# Patient Record
Sex: MALE | Race: WHITE | NOT HISPANIC OR LATINO | Employment: OTHER | URBAN - METROPOLITAN AREA
[De-identification: names, ages, dates, MRNs, and addresses within clinical notes are randomized per-mention and may not be internally consistent; named-entity substitution may affect disease eponyms.]

---

## 2018-09-10 ENCOUNTER — OFFICE VISIT CONVERTED (OUTPATIENT)
Dept: UROLOGY | Facility: CLINIC | Age: 73
End: 2018-09-10
Attending: UROLOGY

## 2019-04-24 ENCOUNTER — OFFICE VISIT CONVERTED (OUTPATIENT)
Dept: SURGERY | Facility: CLINIC | Age: 74
End: 2019-04-24
Attending: PHYSICIAN ASSISTANT

## 2019-04-24 ENCOUNTER — HOSPITAL ENCOUNTER (OUTPATIENT)
Dept: SURGERY | Facility: CLINIC | Age: 74
Discharge: HOME | End: 2019-04-24
Attending: PHYSICIAN ASSISTANT

## 2019-04-24 ENCOUNTER — CONVERSION ENCOUNTER (OUTPATIENT)
Dept: SURGERY | Facility: CLINIC | Age: 74
End: 2019-04-24

## 2019-04-26 LAB — BACTERIA UR CULT: NORMAL

## 2019-05-22 ENCOUNTER — OFFICE VISIT CONVERTED (OUTPATIENT)
Dept: SURGERY | Facility: CLINIC | Age: 74
End: 2019-05-22
Attending: PHYSICIAN ASSISTANT

## 2019-09-10 ENCOUNTER — HOSPITAL ENCOUNTER (OUTPATIENT)
Dept: OTHER | Facility: HOSPITAL | Age: 74
Discharge: HOME | End: 2019-09-10
Attending: UROLOGY

## 2019-09-10 LAB — PSA SERPL-MCNC: 2.07 NG/ML (ref 0–4)

## 2020-07-07 ENCOUNTER — HOSPITAL ENCOUNTER (OUTPATIENT)
Dept: OTHER | Facility: HOSPITAL | Age: 75
Discharge: HOME | End: 2020-07-07
Attending: PHYSICIAN ASSISTANT

## 2020-07-07 LAB — PSA SERPL-MCNC: 1.34 NG/ML (ref 0–4)

## 2020-07-13 ENCOUNTER — TELEPHONE CONVERTED (OUTPATIENT)
Dept: UROLOGY | Facility: CLINIC | Age: 75
End: 2020-07-13
Attending: UROLOGY

## 2021-05-13 NOTE — PROGRESS NOTES
Progress Note      Patient Name: Lewis Runner   Patient ID: [id number]   Sex: Male   YOB: 1945    Primary Care Provider: Rajan Orozco MD   Referring Provider: Rajan Orozco MD    Visit Date: July 13, 2020    Provider: Ramón Keys MD   [facility]   Location Address: [address] [place] [address]  [id number]   Location Phone: [phone]          Chief Complaint  · pt here for urologic issues     1 month follow-up       History Of Present Illness  TELEHEALTH TELEPHONE VISIT  Lewis C. Runner is a 74 year old /White male who is presenting for evaluation via telehealth telephone visit. Verbal consent obtained before beginning visit.   Provider spent 13 minutes with the patient during the telehealth visit.   The following staff were present during this visit: imer chamorro   Past Medical History/ Overview of Patient Symptoms     74-year-old  gentleman who is been followed chronically elevated PSAs and some BPH.      On Flomax and finasteride daily.  Ok stream.  Flomax does help.  No trouble with initiation of stream. Nocturia X 1-2 .  Improvement in daytime frequency.  No incontinence.      no gross hematuria or urinary tract infections.       PVR    5/19       Previous medical notes:      No history of kidney stone.    No urologic family history,   Has never had any urologic surgery.    No cardiopulmonary history.  Patient does not smoke.  Patient does not use blood thinner.  Father lived to be 90, grandfather lived to 83.    h/o left retromolar squamous cell carcinoma.  Status post surgery 2/17 and XRT to face 4/17    PSA -on  finasteride    7/20   1.34  9/19   2.07  9/18   1.77  6/17  1.64  2/16   3.3  11/15  5.5  11/14  3.75  9/13   2.7  7/11   2.9  11/10 2.8  2/02  1.9           Past Medical History  Back pain; BPH; BPH with Urinary Obstruction; Elevated prostate specific antigen (PSA); Elevated PSA; GERD; Mouth Cancer; Radiation Therapy;  "Screening for prostate cancer         Past Surgical History  Blepharoplasty of bilateral upper and lower eyelids; Colonoscopy; Dissection, neck, radical, with mandibulectomy; Graft of mandible; Tonsilectomy         Medication List  duloxetine 30 mg oral capsule,delayed release(DR/EC); finasteride 5 mg oral tablet; levothyroxine 25 mcg oral tablet; multivitamin oral capsule; omeprazole 40 mg oral capsule,delayed release(DR/EC); oxycodone 5 mg oral tablet; tamsulosin 0.4 mg oral capsule         Allergy List  No known allergies         Family Medical History  Liver Neoplasm, Malignant; Heart Disease         Social History  Tobacco (Former)         Immunizations  Name Date Admin   Influenza          Review of Systems  · Constitutional  o Denies  o : fever  · Gastrointestinal  o Denies  o : vomiting, flank pain  · Genitourinary  o Denies  o : urinary leakage, trouble voiding      Vitals  Date Time BP Position Site L\R Cuff Size HR RR TEMP (F) WT  HT  BMI kg/m2 BSA m2 O2 Sat        05/22/2019 10:45 AM       16  169lbs 9oz 5'  3\" 30.04 1.85                   Assessment  · Prostate CA     185/C61  · Benign prostatic hyperplasia with urinary obstruction       Benign prostatic hyperplasia with lower urinary tract symptoms     600.01/N40.1  Other obstructive and reflux uropathy     600.01/N13.8  · Incomplete bladder emptying     788.21/R33.9  · Prostate cancer screening     V76.44/Z12.5    Problems Reconciled  Plan  · Orders  o Physician Telephone Evaluation, 11-20 minutes (62887) - 600.01/N13.8, 185/C61 - 07/13/2020  o PSA Screening, Ultrasensitive, MEDICARE [facility] () - V76.44/Z12.5 - 07/13/2021  · Medications  o Medications have been Reconciled  o Transition of Care or Provider Policy  · Instructions  o Electronically Identified Patient Education Materials Provided Electronically         BPH    Continue Flomax 0.4 mg daily and finasteride 5 mg daily.  Refill today    Prostate cancer screening    Patient has longevity in " his family after discussion we will continue to follow his PSAs for a few more years follow-up in 1 year with a PSA    PVR at  follow-up                   Electronically Signed by: Ramón Keys MD -Author on July 13, 2020 11:31:00 AM

## 2021-05-15 VITALS — RESPIRATION RATE: 12 BRPM | HEIGHT: 65 IN | BODY MASS INDEX: 28.89 KG/M2 | WEIGHT: 173.37 LBS

## 2021-05-15 VITALS — WEIGHT: 169.56 LBS | HEIGHT: 63 IN | RESPIRATION RATE: 16 BRPM | BODY MASS INDEX: 30.04 KG/M2

## 2021-05-16 VITALS — HEIGHT: 66 IN | BODY MASS INDEX: 28.77 KG/M2 | WEIGHT: 179 LBS | RESPIRATION RATE: 15 BRPM

## 2021-07-24 PROBLEM — C06.9 MOUTH CANCER (HCC): Status: ACTIVE | Noted: 2021-07-24

## 2021-07-24 PROBLEM — R97.20 ELEVATED PROSTATE SPECIFIC ANTIGEN (PSA): Status: ACTIVE | Noted: 2017-06-21

## 2021-07-24 PROBLEM — K21.9 GERD (GASTROESOPHAGEAL REFLUX DISEASE): Status: ACTIVE | Noted: 2021-07-24

## 2021-07-24 PROBLEM — M54.9 BACK PAIN: Status: ACTIVE | Noted: 2021-07-24

## 2021-07-24 PROBLEM — Z92.3 HX OF RADIATION THERAPY: Status: ACTIVE | Noted: 2021-07-24

## 2021-07-24 PROBLEM — N40.0 BPH (BENIGN PROSTATIC HYPERPLASIA): Status: ACTIVE | Noted: 2021-07-24

## 2021-09-28 ENCOUNTER — TRANSCRIBE ORDERS (OUTPATIENT)
Dept: ADMINISTRATIVE | Facility: HOSPITAL | Age: 76
End: 2021-09-28

## 2021-09-28 ENCOUNTER — LAB (OUTPATIENT)
Dept: LAB | Facility: HOSPITAL | Age: 76
End: 2021-09-28

## 2021-09-28 DIAGNOSIS — Z12.5 SCREENING FOR PROSTATE CANCER: Primary | ICD-10-CM

## 2021-09-28 DIAGNOSIS — Z12.5 SCREENING FOR PROSTATE CANCER: ICD-10-CM

## 2021-09-28 LAB — PSA SERPL-MCNC: 1.14 NG/ML (ref 0–4)

## 2021-09-28 PROCEDURE — G0103 PSA SCREENING: HCPCS

## 2021-09-28 PROCEDURE — 36415 COLL VENOUS BLD VENIPUNCTURE: CPT

## 2021-10-03 PROBLEM — C61 PROSTATE CANCER: Status: ACTIVE | Noted: 2021-10-03

## 2021-10-03 NOTE — PROGRESS NOTES
Chief Complaint    Urologic complaint    Subjective          Lewis C Runner presents to [facility]  History of Present Illness     75-year-old  gentleman who is been followed chronically elevated PSAs and some BPH.      Patient did increase his dose dose of Flomax 0.8 mg daily.  He can tell that he does better with 0.8 and 0.4.  Is been doing this for about a week.  He is also on finasteride 5 mg daily for the last 2 years.  Minimal incontinence, no pads.     no GH/UTI     He is still dealing with squamous cell carcinoma.  Surgery was 8/21    PVR    10/21   000    Results for orders placed or performed in visit on 10/04/21   Bladder Scan   Result Value Ref Range    Urine Volume 0    POC Urinalysis Dipstick    Specimen: Urine   Result Value Ref Range    Color Yellow Yellow, Straw, Dark Yellow, Lizabeth    Clarity, UA Clear Clear    Glucose, UA Negative Negative, 1000 mg/dL (3+) mg/dL    Bilirubin Negative Negative    Ketones, UA Negative Negative    Specific Gravity  1.020 1.005 - 1.030    Blood, UA Negative Negative    pH, Urine 7.0 5.0 - 8.0    Protein, POC Negative Negative mg/dL    Urobilinogen, UA Normal Normal    Leukocytes Negative Negative    Nitrite, UA Negative Negative         Previous medical notes:      No history of kidney stone.    No urologic family history,   Has never had any urologic surgery.    No cardiopulmonary history.  Patient does not smoke.  Patient does not use blood thinner.  Father lived to be 90, grandfather lived to 83.    h/o left retromolar squamous cell carcinoma.  Status post surgery 2/17 and XRT to face 4/17    PSA -on  finasteride    10/21  1.14  7/20    1.34  9/19   2.07  9/18   1.77  6/17  1.64  2/16   3.3  11/15  5.5  11/14  3.75  9/13   2.7  7/11   2.9  11/10 2.8  2/02  1.9         Past History:  Medical History: has a past medical history of Back pain, BPH (benign prostatic hyperplasia), BPH with urinary obstruction (11/18/2015), Elevated PSA  (06/21/2017), Elevated PSA (11/19/2015), GERD (gastroesophageal reflux disease), History of radiation therapy, Mouth cancer (CMS/HCC), and Screening for prostate cancer (11/12/2014).   Surgical History: has a past surgical history that includes Blepharoplasty (Bilateral); Colonoscopy; clotilde mandibulectomy with fibula shaft graft and neck dissection; and Tonsilectomy, adenoidectomy, bilateral myringotomy and tubes.   Family History: family history includes Heart disease in his mother; Liver cancer in his mother.   Social History: reports that he has quit smoking. He does not have any smokeless tobacco history on file.  Allergies: Patient has no allergy information on record.     No current outpatient medications on file.     Physical exam       Alert and orient x3  Well appearing, well developed, in no acute distress   Unlabored respirations  Nontender/nondistended      Grossly oriented to person, place and time, judgment is intact, normal mood and affect    Results for orders placed or performed in visit on 09/28/21   PSA Screen    Specimen: Blood   Result Value Ref Range    PSA 1.140 0.000 - 4.000 ng/mL        Objective     Vital Signs:   There were no vitals taken for this visit.             Assessment and Plan    Diagnoses and all orders for this visit:    1. Benign prostatic hyperplasia with urinary frequency (Primary)    2. Prostate cancer (HCC)      BPH    Continue Flomax 0.8 mg daily and finasteride 5 mg daily.  Refilled today.  May be interested in procedures in the future but at this time wants to hold off.    Prostate cancer screening    No further screening.  Risk and benefits discussed.    F/u with NP in 1 yr in [place]

## 2021-10-04 ENCOUNTER — OFFICE VISIT (OUTPATIENT)
Dept: UROLOGY | Facility: CLINIC | Age: 76
End: 2021-10-04

## 2021-10-04 VITALS — RESPIRATION RATE: 17 BRPM | BODY MASS INDEX: 32.89 KG/M2 | HEIGHT: 63 IN | WEIGHT: 185.6 LBS

## 2021-10-04 DIAGNOSIS — R35.0 BENIGN PROSTATIC HYPERPLASIA WITH URINARY FREQUENCY: Primary | ICD-10-CM

## 2021-10-04 DIAGNOSIS — N40.1 BENIGN PROSTATIC HYPERPLASIA WITH URINARY FREQUENCY: Primary | ICD-10-CM

## 2021-10-04 DIAGNOSIS — C61 PROSTATE CANCER (HCC): ICD-10-CM

## 2021-10-04 LAB
BILIRUB BLD-MCNC: NEGATIVE MG/DL
CLARITY, POC: CLEAR
COLOR UR: YELLOW
GLUCOSE UR STRIP-MCNC: NEGATIVE MG/DL
KETONES UR QL: NEGATIVE
LEUKOCYTE EST, POC: NEGATIVE
NITRITE UR-MCNC: NEGATIVE MG/ML
PH UR: 7 [PH] (ref 5–8)
PROT UR STRIP-MCNC: NEGATIVE MG/DL
RBC # UR STRIP: NEGATIVE /UL
SP GR UR: 1.02 (ref 1–1.03)
URINE VOLUME: 0
UROBILINOGEN UR QL: NORMAL

## 2021-10-04 PROCEDURE — 51798 US URINE CAPACITY MEASURE: CPT | Performed by: UROLOGY

## 2021-10-04 PROCEDURE — 99212 OFFICE O/P EST SF 10 MIN: CPT | Performed by: UROLOGY

## 2021-10-04 PROCEDURE — 81003 URINALYSIS AUTO W/O SCOPE: CPT | Performed by: UROLOGY

## 2021-10-04 RX ORDER — LEVOTHYROXINE SODIUM 0.03 MG/1
TABLET ORAL
COMMUNITY
Start: 2021-08-19 | End: 2023-01-30 | Stop reason: SDUPTHER

## 2021-10-04 RX ORDER — TAMSULOSIN HYDROCHLORIDE 0.4 MG/1
2 CAPSULE ORAL DAILY
Qty: 180 CAPSULE | Refills: 4 | Status: SHIPPED | OUTPATIENT
Start: 2021-10-04 | End: 2022-12-05 | Stop reason: SDUPTHER

## 2021-10-04 RX ORDER — FINASTERIDE 5 MG/1
5 TABLET, FILM COATED ORAL DAILY
COMMUNITY
Start: 2021-04-21 | End: 2021-10-04 | Stop reason: SDUPTHER

## 2021-10-04 RX ORDER — MULTIPLE VITAMINS W/ MINERALS TAB 9MG-400MCG
1 TAB ORAL DAILY
COMMUNITY
End: 2023-01-30 | Stop reason: SDUPTHER

## 2021-10-04 RX ORDER — PENTOXIFYLLINE 400 MG/1
400 TABLET, EXTENDED RELEASE ORAL DAILY
COMMUNITY
Start: 2021-08-13 | End: 2021-11-11

## 2021-10-04 RX ORDER — FINASTERIDE 5 MG/1
5 TABLET, FILM COATED ORAL DAILY
Qty: 90 TABLET | Refills: 4 | Status: SHIPPED | OUTPATIENT
Start: 2021-10-04 | End: 2022-10-03 | Stop reason: SDUPTHER

## 2021-10-04 RX ORDER — OMEPRAZOLE 20 MG/1
20 CAPSULE, DELAYED RELEASE ORAL DAILY
COMMUNITY
End: 2023-02-13

## 2021-10-04 RX ORDER — DULOXETIN HYDROCHLORIDE 60 MG/1
60 CAPSULE, DELAYED RELEASE ORAL DAILY
COMMUNITY
End: 2023-01-23

## 2021-10-04 RX ORDER — ASPIRIN 81 MG/1
81 TABLET, CHEWABLE ORAL DAILY
COMMUNITY
End: 2022-12-05

## 2021-10-04 RX ORDER — GABAPENTIN 300 MG/1
300 CAPSULE ORAL
COMMUNITY
Start: 2021-06-28 | End: 2022-12-05

## 2022-08-11 ENCOUNTER — TELEPHONE (OUTPATIENT)
Dept: UROLOGY | Facility: CLINIC | Age: 77
End: 2022-08-11

## 2022-08-16 ENCOUNTER — TELEPHONE (OUTPATIENT)
Dept: UROLOGY | Facility: CLINIC | Age: 77
End: 2022-08-16

## 2022-08-16 DIAGNOSIS — C61 PROSTATE CANCER: Primary | ICD-10-CM

## 2022-10-03 ENCOUNTER — TELEPHONE (OUTPATIENT)
Dept: SURGERY | Facility: CLINIC | Age: 77
End: 2022-10-03

## 2022-10-03 DIAGNOSIS — R35.0 BENIGN PROSTATIC HYPERPLASIA WITH URINARY FREQUENCY: ICD-10-CM

## 2022-10-03 DIAGNOSIS — N40.1 BENIGN PROSTATIC HYPERPLASIA WITH URINARY FREQUENCY: ICD-10-CM

## 2022-10-03 RX ORDER — FINASTERIDE 5 MG/1
5 TABLET, FILM COATED ORAL DAILY
Qty: 90 TABLET | Refills: 0 | Status: SHIPPED | OUTPATIENT
Start: 2022-10-03 | End: 2022-12-05 | Stop reason: SDUPTHER

## 2022-10-03 NOTE — TELEPHONE ENCOUNTER
Pts wife called and stated that pts prescription for finasteride will run out before he sees  in [facility] in December. She stated that the appt was going to be in oct with Carolina. She is afraid with appt being pushed out meds wont last. She would like for a 90 day supply be sent in through [facility] mail service pharmacy.    Slowed

## 2022-11-22 ENCOUNTER — LAB (OUTPATIENT)
Dept: LAB | Facility: HOSPITAL | Age: 77
End: 2022-11-22

## 2022-11-22 DIAGNOSIS — C61 PROSTATE CANCER: ICD-10-CM

## 2022-11-22 LAB — PSA SERPL-MCNC: 2.3 NG/ML (ref 0–4)

## 2022-11-22 PROCEDURE — 84153 ASSAY OF PSA TOTAL: CPT

## 2022-11-22 PROCEDURE — 36415 COLL VENOUS BLD VENIPUNCTURE: CPT

## 2022-12-03 PROBLEM — N40.1 BENIGN PROSTATIC HYPERPLASIA WITH LOWER URINARY TRACT SYMPTOMS: Status: ACTIVE | Noted: 2022-12-03

## 2022-12-03 NOTE — PROGRESS NOTES
Chief Complaint    Urologic complaint    Subjective          Juan Jose SANFORD Runjamison presents to [facility]  History of Present Illness     77-year-old  gentleman      h/o elevated PSA   BPH.      Recent pneumonia, doing okay    Currently on Flomax 0.4 mg daily and finasteride 5 mg daily.  Minimal leakage.  No pads.  Nocturia x2.  Voiding okay, no straining.      Flomax 0.8 - did not make things any better so stayed on 1 tab    no GH    H/o squamous cell carcinoma neck 8/21-doing well, cured as far as he knows    No cardiopulmonary history.  Patient does not smoke.  Patient does not use blood thinner.  Father lived to be 90, grandfather lived to 83.    No further prostate cancer screening    PVR    10/21   000    Results for orders placed or performed in visit on 11/22/22   PSA DIAGNOSTIC    Specimen: Blood   Result Value Ref Range    PSA 2.300 0.000 - 4.000 ng/mL         Previous medical notes:      No history of kidney stone.    No urologic family history,   Has never had any urologic surgery.        h/o left retromolar squamous cell carcinoma.  Status post surgery 2/17 and XRT to face 4/17    PSA -on  finasteride    10/21  1.14  7/20    1.34  9/19   2.07  9/18   1.77  6/17  1.64  2/16   3.3  11/15  5.5  11/14  3.75  9/13   2.7  7/11   2.9  11/10 2.8  2/02  1.9         Past History:  Medical History: has a past medical history of Back pain, BPH (benign prostatic hyperplasia), BPH with urinary obstruction (11/18/2015), Elevated PSA (06/21/2017), Elevated PSA (11/19/2015), GERD (gastroesophageal reflux disease), History of radiation therapy, Mouth cancer (HCC), and Screening for prostate cancer (11/12/2014).   Surgical History: has a past surgical history that includes Blepharoplasty (Bilateral); Colonoscopy; clotilde mandibulectomy with fibula shaft graft and neck dissection; and Tonsilectomy, adenoidectomy, bilateral myringotomy and tubes.   Family History: family history includes Heart  disease in his mother; Liver cancer in his mother.   Social History: reports that he has quit smoking. He has never used smokeless tobacco.  Allergies: Patient has no known allergies.       Current Outpatient Medications:   •  aspirin 81 MG chewable tablet, Chew 81 mg Daily., Disp: , Rfl:   •  DULoxetine (CYMBALTA) 60 MG capsule, Take 60 mg by mouth Daily., Disp: , Rfl:   •  finasteride (PROSCAR) 5 MG tablet, Take 1 tablet by mouth Daily., Disp: 90 tablet, Rfl: 0  •  gabapentin (NEURONTIN) 300 MG capsule, Take 300 mg by mouth., Disp: , Rfl:   •  levothyroxine (SYNTHROID, LEVOTHROID) 25 MCG tablet, , Disp: , Rfl:   •  multivitamin with minerals (multivitamin with minerals) tablet tablet, Take 1 tablet by mouth Daily., Disp: , Rfl:   •  omeprazole (priLOSEC) 20 MG capsule, Take 20 mg by mouth Daily., Disp: , Rfl:   •  tamsulosin (FLOMAX) 0.4 MG capsule 24 hr capsule, Take 2 capsules by mouth Daily., Disp: 180 capsule, Rfl: 4         Results for orders placed or performed in visit on 11/22/22   PSA DIAGNOSTIC    Specimen: Blood   Result Value Ref Range    PSA 2.300 0.000 - 4.000 ng/mL        Objective     Vital Signs:   There were no vitals taken for this visit.             Assessment and Plan    Diagnoses and all orders for this visit:    1. Benign prostatic hyperplasia with lower urinary tract symptoms, symptom details unspecified (Primary)      BPH    Continue Flomax 0.4 mg daily and finasteride 5 mg daily.  Refilled today.    Discussed with patient risk and benefits of TURP today, patient not interested if he changes mind he will let me know.        Prostate cancer screening    No further screening.      F/u with NP in 1 yr in [place]

## 2022-12-05 ENCOUNTER — OFFICE VISIT (OUTPATIENT)
Dept: UROLOGY | Facility: CLINIC | Age: 77
End: 2022-12-05

## 2022-12-05 ENCOUNTER — TELEPHONE (OUTPATIENT)
Dept: UROLOGY | Facility: CLINIC | Age: 77
End: 2022-12-05

## 2022-12-05 VITALS — WEIGHT: 188.6 LBS | BODY MASS INDEX: 33.41 KG/M2 | RESPIRATION RATE: 18 BRPM

## 2022-12-05 DIAGNOSIS — N40.1 BENIGN PROSTATIC HYPERPLASIA WITH URINARY FREQUENCY: ICD-10-CM

## 2022-12-05 DIAGNOSIS — N40.1 BENIGN PROSTATIC HYPERPLASIA WITH LOWER URINARY TRACT SYMPTOMS, SYMPTOM DETAILS UNSPECIFIED: Primary | ICD-10-CM

## 2022-12-05 DIAGNOSIS — R35.0 BENIGN PROSTATIC HYPERPLASIA WITH URINARY FREQUENCY: ICD-10-CM

## 2022-12-05 PROCEDURE — 99213 OFFICE O/P EST LOW 20 MIN: CPT | Performed by: UROLOGY

## 2022-12-05 RX ORDER — FINASTERIDE 5 MG/1
5 TABLET, FILM COATED ORAL DAILY
Qty: 90 TABLET | Refills: 4 | Status: SHIPPED | OUTPATIENT
Start: 2022-12-05 | End: 2022-12-05

## 2022-12-05 RX ORDER — TAMSULOSIN HYDROCHLORIDE 0.4 MG/1
1 CAPSULE ORAL DAILY
Qty: 90 CAPSULE | Refills: 4 | Status: SHIPPED | OUTPATIENT
Start: 2022-12-05 | End: 2023-01-23 | Stop reason: SDUPTHER

## 2022-12-05 RX ORDER — TAMSULOSIN HYDROCHLORIDE 0.4 MG/1
1 CAPSULE ORAL DAILY
Qty: 90 CAPSULE | Refills: 4 | Status: SHIPPED | OUTPATIENT
Start: 2022-12-05 | End: 2022-12-05

## 2022-12-05 RX ORDER — LISINOPRIL 5 MG/1
TABLET ORAL
COMMUNITY
Start: 2022-10-28 | End: 2023-01-23

## 2022-12-05 RX ORDER — FINASTERIDE 5 MG/1
5 TABLET, FILM COATED ORAL DAILY
Qty: 90 TABLET | Refills: 4 | Status: SHIPPED | OUTPATIENT
Start: 2022-12-05 | End: 2023-01-30

## 2022-12-05 NOTE — TELEPHONE ENCOUNTER
The prescriptions were sent to [facility], and I notified the patient's wife.

## 2022-12-05 NOTE — TELEPHONE ENCOUNTER
Patient was seen today at [place].  His finasteride and tamsulosin prescriptions were supposed to go to [facility] for 90-day supplies, but they were sent to [facility].    Please send to [facility] and notify patient's wife.

## 2022-12-12 ENCOUNTER — HOSPITAL ENCOUNTER (OUTPATIENT)
Dept: OTHER | Facility: HOSPITAL | Age: 77
Discharge: HOME | End: 2022-12-12

## 2022-12-19 ENCOUNTER — HOSPITAL ENCOUNTER (OUTPATIENT)
Dept: OTHER | Facility: HOSPITAL | Age: 77
Discharge: HOME | End: 2022-12-19

## 2023-01-18 ENCOUNTER — TELEPHONE (OUTPATIENT)
Dept: UROLOGY | Facility: CLINIC | Age: 78
End: 2023-01-18
Payer: MEDICARE

## 2023-01-18 NOTE — TELEPHONE ENCOUNTER
Pt had surgery out of town on 01/09 and had to have a catheter placed for urinary retention. He was discharged yesterday from the hospital with the catheter after failing a voiding trial and instructed to f/u with his urologist. Wife is calling to see when he should be seen. She is fine to see Carolina as she would prefer [facility]. She just wants to know what Dr Bazan recommends. Please call her and advise.

## 2023-01-18 NOTE — TELEPHONE ENCOUNTER
Returned spouse's call after hearing back from Dr Keys. Per Dr Keys, pt can see Carolina next week for [facility] follow up and repeat void trial. Spouse agreed to schedule pt on 01/23/23 at 0945 with Carolina Kentrell in [place]. She also asked about catheter care, as she was not educated on this prior to discharge from hospital. I advised her to ensure the penis is cleansed BID and PRN. She can just use soap and water to cleanse the area. I also educated her on cleansing the catheter tubing without pulling on it too much. She then asked about how to empty the bag. I educated her on this. She repeated the instructions back to me without needing repeat instruction. She will call back with further questions.

## 2023-01-23 ENCOUNTER — OFFICE VISIT (OUTPATIENT)
Dept: UROLOGY | Facility: CLINIC | Age: 78
End: 2023-01-23
Payer: MEDICARE

## 2023-01-23 VITALS — WEIGHT: 180.2 LBS | RESPIRATION RATE: 16 BRPM | HEIGHT: 64 IN | BODY MASS INDEX: 30.77 KG/M2

## 2023-01-23 DIAGNOSIS — N40.1 BENIGN PROSTATIC HYPERPLASIA WITH URINARY FREQUENCY: ICD-10-CM

## 2023-01-23 DIAGNOSIS — R35.0 BENIGN PROSTATIC HYPERPLASIA WITH URINARY FREQUENCY: ICD-10-CM

## 2023-01-23 PROBLEM — A41.9 SEPSIS: Status: ACTIVE | Noted: 2022-12-19
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
## 2023-01-23 PROBLEM — K83.1 BILE DUCT OBSTRUCTION: Status: ACTIVE | Noted: 2022-12-19

## 2023-01-23 PROBLEM — R17 JAUNDICE: Status: ACTIVE | Noted: 2022-12-19

## 2023-01-23 PROBLEM — K81.0 ACUTE CHOLECYSTITIS: Status: ACTIVE | Noted: 2023-01-09

## 2023-01-23 PROBLEM — M27.2 OSTEORADIONECROSIS OF MANDIBLE: Status: ACTIVE | Noted: 2022-11-15

## 2023-01-23 PROCEDURE — 51700 IRRIGATION OF BLADDER: CPT | Performed by: NURSE PRACTITIONER

## 2023-01-23 PROCEDURE — 99213 OFFICE O/P EST LOW 20 MIN: CPT | Performed by: NURSE PRACTITIONER

## 2023-01-23 PROCEDURE — 51798 US URINE CAPACITY MEASURE: CPT | Performed by: NURSE PRACTITIONER

## 2023-01-23 RX ORDER — PSEUDOEPHEDRINE HCL 30 MG
100 TABLET ORAL
COMMUNITY
Start: 2023-01-17 | End: 2023-01-28

## 2023-01-23 RX ORDER — MULTIPLE VITAMINS W/ MINERALS TAB 9MG-400MCG
1 TAB ORAL DAILY
COMMUNITY
End: 2023-02-13

## 2023-01-23 RX ORDER — ACETAMINOPHEN 500 MG
1000 TABLET ORAL
COMMUNITY
Start: 2023-01-17 | End: 2023-01-28

## 2023-01-23 RX ORDER — POLYETHYLENE GLYCOL 3350 17 G/17G
17 POWDER, FOR SOLUTION ORAL DAILY
COMMUNITY

## 2023-01-23 RX ORDER — LEVOTHYROXINE SODIUM 0.03 MG/1
25 TABLET ORAL
COMMUNITY
End: 2023-02-13

## 2023-01-23 RX ORDER — TAMSULOSIN HYDROCHLORIDE 0.4 MG/1
2 CAPSULE ORAL DAILY
Qty: 180 CAPSULE | Refills: 4 | Status: SHIPPED | OUTPATIENT
Start: 2023-01-23

## 2023-01-23 NOTE — PROGRESS NOTES
Chief Complaint: Urinary Retention (Void trail)    Subjective         History of Present Illness  Lewis C Runner is a 77 y.o. male presents to [facility] to be seen for voiding trial.    Patient underwent a laparoscopic cholecystectomy on 1/9/2023 and ended up with postop urinary retention.  He has failed a void trial x1.    He is on tamsulosin 0.4mg q day and finasteride 5 mg q day for several years.    Presents today wishing to have catheter removed.    Objective     Past Medical History:   Diagnosis Date   • Back pain    • BPH (benign prostatic hyperplasia)    • BPH with urinary obstruction 11/18/2015   • Elevated PSA 06/21/2017   • Elevated PSA 11/19/2015   • GERD (gastroesophageal reflux disease)    • History of radiation therapy     Mouth Cancer   • Mouth cancer (HCC)    • Screening for prostate cancer 11/12/2014       Past Surgical History:   Procedure Laterality Date   • BLEPHAROPLASTY Bilateral     Upper and lower eyelids.   • CHOLECYSTECTOMY     • COLONOSCOPY     • RICHIE MANDIBULECTOMY WITH FIBULA SHAFT GRAFT AND NECK DISSECTION     • TONSILECTOMY, ADENOIDECTOMY, BILATERAL MYRINGOTOMY AND TUBES           Current Outpatient Medications:   •  acetaminophen (TYLENOL) 500 MG tablet, Take 1,000 mg by mouth., Disp: , Rfl:   •  docusate sodium 100 MG capsule, Take 100 mg by mouth., Disp: , Rfl:   •  finasteride (PROSCAR) 5 MG tablet, Take 1 tablet by mouth Daily., Disp: 90 tablet, Rfl: 4  •  levothyroxine (SYNTHROID, LEVOTHROID) 25 MCG tablet, , Disp: , Rfl:   •  multivitamin with minerals tablet tablet, Take 1 tablet by mouth Daily., Disp: , Rfl:   •  multivitamin with minerals tablet tablet, Take 1 tablet by mouth Daily., Disp: , Rfl:   •  omeprazole (priLOSEC) 20 MG capsule, Take 20 mg by mouth Daily., Disp: , Rfl:   •  tamsulosin (FLOMAX) 0.4 MG capsule 24 hr capsule, Take 2 capsules by mouth Daily., Disp: 180 capsule, Rfl: 4  •  DULoxetine (CYMBALTA) 60 MG capsule, Take 60 mg by  "mouth Daily., Disp: , Rfl:   •  levothyroxine (SYNTHROID, LEVOTHROID) 25 MCG tablet, Take 25 mcg by mouth., Disp: , Rfl:   •  lisinopril (PRINIVIL,ZESTRIL) 5 MG tablet, , Disp: , Rfl:   •  polyethylene glycol (MIRALAX) 17 GM/SCOOP powder, Take 17 g by mouth Daily., Disp: , Rfl:     No Known Allergies     Family History   Problem Relation Age of Onset   • Liver cancer Mother    • Heart disease Mother        Social History     Socioeconomic History   • Marital status:    Tobacco Use   • Smoking status: Former   • Smokeless tobacco: Never   Vaping Use   • Vaping Use: Never used   Substance and Sexual Activity   • Alcohol use: Not Currently   • Drug use: Never   • Sexual activity: Defer       Vital Signs:   Resp 16   Ht 162.6 cm (64\")   Wt 81.7 kg (180 lb 3.2 oz)   BMI 30.93 kg/m²      Physical Exam     Result Review :   The following data was reviewed by: ASHLY De Jesus on 01/23/2023:  Results for orders placed or performed in visit on 11/22/22   PSA DIAGNOSTIC    Specimen: Blood   Result Value Ref Range    PSA 2.300 0.000 - 4.000 ng/mL      PSA    PSA 11/22/22   PSA 2.300               Procedures        Assessment and Plan    Diagnoses and all orders for this visit:    1. Benign prostatic hyperplasia with urinary frequency  -     tamsulosin (FLOMAX) 0.4 MG capsule 24 hr capsule; Take 2 capsules by mouth Daily.  Dispense: 180 capsule; Refill: 4    Other orders  -     Irrigation of Bladder    We will perform a void trial and teach the patient how to catheterize himself and follow-up with him in 1 week in the office.        I spent 20minutes caring for Juan Jose on this date of service. This time includes time spent by me in the following activities:reviewing tests, obtaining and/or reviewing a separately obtained history, performing a medically appropriate examination and/or evaluation , counseling and educating the patient/family/caregiver, ordering medications, tests, or procedures, and documenting " information in the medical record  Follow Up   Return in about 1 week (around 1/30/2023) for f/u void trial.  Patient was given instructions and counseling regarding his condition or for health maintenance advice. Please see specific information pulled into the AVS if appropriate.         This document has been electronically signed by ASHLY De Jesus  January 23, 2023 12:59 EST

## 2023-01-23 NOTE — PROGRESS NOTES
"Chief Complaint  Urinary Retention (Void trail)    Subjective        Juan Jose SANFORD Runner presents to [facility]  History of Present Illness    Objective   Vital Signs:  Resp 16   Ht 162.6 cm (64\")   Wt 81.7 kg (180 lb 3.2 oz)   BMI 30.93 kg/m²   Estimated body mass index is 30.93 kg/m² as calculated from the following:    Height as of this encounter: 162.6 cm (64\").    Weight as of this encounter: 81.7 kg (180 lb 3.2 oz).             Physical Exam   Result Review :            Irrigation of Bladder    Date/Time: 1/23/2023 10:42 AM  Performed by: Veronika Diaz RN  Authorized by: Carolina Pfeiffer APRN   Preparation: Patient was prepped and draped in the usual sterile fashion.  Local anesthesia used: no    Anesthesia:  Local anesthesia used: no    Sedation:  Patient sedated: no    Patient tolerance: patient tolerated the procedure well with no immediate complications  Comments: Instilled 300 ml of sterile into patient bladder patient was then able to void out 125 ml of urine.  Did a bladder scan an patient bladder scan was 309ml.  Taught how to cic and patient was able to return demonstration with cic and patient inserted 14 fr catheter by self and he received out 250ml of urine. Patient was then educated that if no void in 8 hours or he has supra pubic pain to cic and to measure out amount. Will follow up in 1 week.  If in 8 hours and he has to cic every 8 hours to cic            Assessment and Plan   Diagnoses and all orders for this visit:    1. Benign prostatic hyperplasia with urinary frequency  -     tamsulosin (FLOMAX) 0.4 MG capsule 24 hr capsule; Take 2 capsules by mouth Daily.  Dispense: 180 capsule; Refill: 4    Other orders  -     Irrigation of Bladder             Follow Up   Return in about 1 week (around 1/30/2023) for f/u void trial.  Patient was given instructions and counseling regarding his condition or for health maintenance advice. Please see specific information pulled into " the AVS if appropriate.

## 2023-01-30 ENCOUNTER — OFFICE VISIT (OUTPATIENT)
Dept: UROLOGY | Facility: CLINIC | Age: 78
End: 2023-01-30
Payer: MEDICARE

## 2023-01-30 VITALS — HEIGHT: 66 IN | WEIGHT: 179.6 LBS | BODY MASS INDEX: 28.87 KG/M2

## 2023-01-30 DIAGNOSIS — N40.1 BENIGN PROSTATIC HYPERPLASIA WITH LOWER URINARY TRACT SYMPTOMS, SYMPTOM DETAILS UNSPECIFIED: Primary | ICD-10-CM

## 2023-01-30 LAB — URINE VOLUME: 150

## 2023-01-30 PROCEDURE — 51798 US URINE CAPACITY MEASURE: CPT | Performed by: NURSE PRACTITIONER

## 2023-01-30 PROCEDURE — 99212 OFFICE O/P EST SF 10 MIN: CPT | Performed by: NURSE PRACTITIONER

## 2023-01-30 RX ORDER — FINASTERIDE 5 MG/1
TABLET, FILM COATED ORAL EVERY 24 HOURS
COMMUNITY

## 2023-01-30 RX ORDER — OMEPRAZOLE 20 MG/1
CAPSULE, DELAYED RELEASE ORAL EVERY 24 HOURS
COMMUNITY

## 2023-01-30 RX ORDER — LEVOTHYROXINE SODIUM 0.03 MG/1
TABLET ORAL EVERY 24 HOURS
COMMUNITY

## 2023-01-30 RX ORDER — NAPROXEN SODIUM 220 MG
TABLET ORAL EVERY 12 HOURS
COMMUNITY
End: 2023-02-13

## 2023-01-30 RX ORDER — DIPHENOXYLATE HYDROCHLORIDE AND ATROPINE SULFATE 2.5; .025 MG/1; MG/1
TABLET ORAL
COMMUNITY

## 2023-01-30 NOTE — PROGRESS NOTES
Chief Complaint: Urinary Retention    Subjective         History of Present Illness  Lewis C Runner is a 77 y.o. male presents to [facility] to be seen for follow-up urinary retention.    The patient has been catheterizing himself every 8 hours at home.    He has not been able to void on his own.        Objective     Past Medical History:   Diagnosis Date   • Back pain    • BPH (benign prostatic hyperplasia)    • BPH with urinary obstruction 11/18/2015   • Elevated PSA 06/21/2017   • Elevated PSA 11/19/2015   • GERD (gastroesophageal reflux disease)    • History of radiation therapy     Mouth Cancer   • Mouth cancer (HCC)    • Screening for prostate cancer 11/12/2014       Past Surgical History:   Procedure Laterality Date   • BLEPHAROPLASTY Bilateral     Upper and lower eyelids.   • CHOLECYSTECTOMY     • COLONOSCOPY     • RICHIE MANDIBULECTOMY WITH FIBULA SHAFT GRAFT AND NECK DISSECTION     • TONSILECTOMY, ADENOIDECTOMY, BILATERAL MYRINGOTOMY AND TUBES           Current Outpatient Medications:   •  finasteride (PROSCAR) 5 MG tablet, Daily., Disp: , Rfl:   •  levothyroxine (SYNTHROID, LEVOTHROID) 25 MCG tablet, Take 25 mcg by mouth., Disp: , Rfl:   •  levothyroxine (SYNTHROID, LEVOTHROID) 25 MCG tablet, Daily., Disp: , Rfl:   •  multivitamin (THERAGRAN) tablet tablet, Multivitamin 50 Plus tablet  Take by oral route., Disp: , Rfl:   •  multivitamin with minerals tablet tablet, Take 1 tablet by mouth Daily., Disp: , Rfl:   •  naproxen sodium (Aleve) 220 MG tablet, Every 12 (Twelve) Hours., Disp: , Rfl:   •  omeprazole (priLOSEC) 20 MG capsule, Take 20 mg by mouth Daily., Disp: , Rfl:   •  omeprazole (priLOSEC) 20 MG capsule, Daily., Disp: , Rfl:   •  polyethylene glycol (MIRALAX) 17 GM/SCOOP powder, Take 17 g by mouth Daily., Disp: , Rfl:   •  Sennosides 25 MG tablet, Daily., Disp: , Rfl:   •  tamsulosin (FLOMAX) 0.4 MG capsule 24 hr capsule, Take 2 capsules by mouth Daily., Disp: 180 capsule,  "Rfl: 4    No Known Allergies     Family History   Problem Relation Age of Onset   • Liver cancer Mother    • Heart disease Mother        Social History     Socioeconomic History   • Marital status:    Tobacco Use   • Smoking status: Former   • Smokeless tobacco: Never   Vaping Use   • Vaping Use: Never used   Substance and Sexual Activity   • Alcohol use: Not Currently   • Drug use: Never   • Sexual activity: Defer       Vital Signs:   Ht 167.6 cm (66\")   Wt 81.5 kg (179 lb 9.6 oz)   BMI 28.99 kg/m²      Physical Exam     Result Review :   The following data was reviewed by: ASHLY De Jesus on 01/30/2023:  Results for orders placed or performed in visit on 01/30/23   Bladder Scan   Result Value Ref Range    Urine Volume 150       PSA    PSA 11/22/22   PSA 2.300         Bladder Scan interpretation 01/30/2023    Estimation of residual urine via BVI 3000 Verathon Bladder Scan  MA/nurse performing:  Tangela NOGUEIRA RN   Residual Urine: 150 ml  Indication: Benign prostatic hyperplasia with lower urinary tract symptoms, symptom details unspecified   Position: Supine  Examination: Incremental scanning of the suprapubic area using 2.0 MHz transducer using copious amounts of acoustic gel.   Findings: An anechoic area was demonstrated which represented the bladder, with measurement of residual urine as noted. I inspected this myself. In that the residual urine was stable or insignificant, refer to plan for treatment and plan necessary at this time.           Procedures        Assessment and Plan    Diagnoses and all orders for this visit:    1. Benign prostatic hyperplasia with lower urinary tract symptoms, symptom details unspecified (Primary)  -     Bladder Scan      Discussed the patient at this point time I would like to have him catheterize himself at least every 4-6 hours during the day and he can go a stretch of up to 8 hours during the evening if he is sleeping through the night.  We will order catheters for " him follow-up with him in 2-week        I spent 10 minutes caring for Juan Jose on this date of service. This time includes time spent by me in the following activities:reviewing tests, obtaining and/or reviewing a separately obtained history, performing a medically appropriate examination and/or evaluation , counseling and educating the patient/family/caregiver, ordering medications, tests, or procedures, and documenting information in the medical record  Follow Up   Return in about 2 weeks (around 2/13/2023) for f/u cic .  Patient was given instructions and counseling regarding his condition or for health maintenance advice. Please see specific information pulled into the AVS if appropriate.         This document has been electronically signed by ASHLY De Jesus  January 30, 2023 13:32 EST

## 2023-02-13 ENCOUNTER — OFFICE VISIT (OUTPATIENT)
Dept: UROLOGY | Facility: CLINIC | Age: 78
End: 2023-02-13
Payer: MEDICARE

## 2023-02-13 VITALS — HEIGHT: 66 IN | RESPIRATION RATE: 20 BRPM | WEIGHT: 183.2 LBS | BODY MASS INDEX: 29.44 KG/M2

## 2023-02-13 DIAGNOSIS — R35.0 BENIGN PROSTATIC HYPERPLASIA WITH URINARY FREQUENCY: Primary | ICD-10-CM

## 2023-02-13 DIAGNOSIS — N40.1 BENIGN PROSTATIC HYPERPLASIA WITH URINARY FREQUENCY: Primary | ICD-10-CM

## 2023-02-13 LAB
BILIRUB BLD-MCNC: ABNORMAL MG/DL
CLARITY, POC: ABNORMAL
COLOR UR: ABNORMAL
EXPIRATION DATE: ABNORMAL
GLUCOSE UR STRIP-MCNC: NEGATIVE MG/DL
KETONES UR QL: ABNORMAL
LEUKOCYTE EST, POC: ABNORMAL
Lab: ABNORMAL
NITRITE UR-MCNC: NEGATIVE MG/ML
PH UR: 5.5 [PH] (ref 5–8)
PROT UR STRIP-MCNC: ABNORMAL MG/DL
RBC # UR STRIP: ABNORMAL /UL
SP GR UR: 1.03 (ref 1–1.03)
URINE VOLUME: NORMAL
UROBILINOGEN UR QL: ABNORMAL

## 2023-02-13 PROCEDURE — 87086 URINE CULTURE/COLONY COUNT: CPT | Performed by: NURSE PRACTITIONER

## 2023-02-13 PROCEDURE — 87077 CULTURE AEROBIC IDENTIFY: CPT | Performed by: NURSE PRACTITIONER

## 2023-02-13 PROCEDURE — 81003 URINALYSIS AUTO W/O SCOPE: CPT | Performed by: NURSE PRACTITIONER

## 2023-02-13 PROCEDURE — 51798 US URINE CAPACITY MEASURE: CPT | Performed by: NURSE PRACTITIONER

## 2023-02-13 PROCEDURE — 99213 OFFICE O/P EST LOW 20 MIN: CPT | Performed by: NURSE PRACTITIONER

## 2023-02-13 PROCEDURE — 87186 SC STD MICRODIL/AGAR DIL: CPT | Performed by: NURSE PRACTITIONER

## 2023-02-13 NOTE — PROGRESS NOTES
Chief Complaint: Follow-up, Benign Prostatic Hypertrophy, and Urinary Frequency    Subjective         History of Present Illness  Lewis C Runner is a 77 y.o. male presents to [facility] to be seen for follow-up urinary retention.    He has still been cathing and is able to void on his own a few times in between.     He has no dysuria or issues passing cath.     His urine does appear infected on in office urine dip today.    He reports that he has feeling well at this time.    Previous:    The patient has been catheterizing himself every 8 hours at home.    He has not been able to void on his own.        Objective     Past Medical History:   Diagnosis Date   • Back pain    • BPH (benign prostatic hyperplasia)    • BPH with urinary obstruction 11/18/2015   • Elevated PSA 06/21/2017   • Elevated PSA 11/19/2015   • GERD (gastroesophageal reflux disease)    • History of radiation therapy     Mouth Cancer   • Mouth cancer (HCC)    • Screening for prostate cancer 11/12/2014       Past Surgical History:   Procedure Laterality Date   • BLEPHAROPLASTY Bilateral     Upper and lower eyelids.   • CHOLECYSTECTOMY     • COLONOSCOPY     • RICHIE MANDIBULECTOMY WITH FIBULA SHAFT GRAFT AND NECK DISSECTION     • TONSILECTOMY, ADENOIDECTOMY, BILATERAL MYRINGOTOMY AND TUBES           Current Outpatient Medications:   •  finasteride (PROSCAR) 5 MG tablet, Daily., Disp: , Rfl:   •  levothyroxine (SYNTHROID, LEVOTHROID) 25 MCG tablet, Daily., Disp: , Rfl:   •  multivitamin (THERAGRAN) tablet tablet, Multivitamin 50 Plus tablet  Take by oral route., Disp: , Rfl:   •  omeprazole (priLOSEC) 20 MG capsule, Daily., Disp: , Rfl:   •  polyethylene glycol (MIRALAX) 17 GM/SCOOP powder, Take 17 g by mouth Daily., Disp: , Rfl:   •  Sennosides 25 MG tablet, Daily., Disp: , Rfl:   •  tamsulosin (FLOMAX) 0.4 MG capsule 24 hr capsule, Take 2 capsules by mouth Daily., Disp: 180 capsule, Rfl: 4    No Known Allergies     Family  "History   Problem Relation Age of Onset   • Liver cancer Mother    • Heart disease Mother    • Cancer Mother        Social History     Socioeconomic History   • Marital status:    Tobacco Use   • Smoking status: Former     Packs/day: 1.00     Years: 10.00     Pack years: 10.00     Types: Cigarettes   • Smokeless tobacco: Never   • Tobacco comments:     Quit smoking over 50 yrs ago   Vaping Use   • Vaping Use: Never used   Substance and Sexual Activity   • Alcohol use: Never   • Drug use: Never   • Sexual activity: Defer       Vital Signs:   Resp 20   Ht 167.6 cm (65.98\")   Wt 83.1 kg (183 lb 3.2 oz)   BMI 29.58 kg/m²      Physical Exam     Result Review :   The following data was reviewed by: ASHLY De Jesus on 01/30/2023:  Results for orders placed or performed in visit on 02/13/23   Bladder Scan   Result Value Ref Range    Urine Volume 38ml    POC Urinalysis Dipstick, Automated    Specimen: Urine   Result Value Ref Range    Color Lizabeth Yellow, Straw, Dark Yellow, Lizabeth    Clarity, UA Cloudy (A) Clear    Specific Gravity  1.030 1.005 - 1.030    pH, Urine 5.5 5.0 - 8.0    Leukocytes Small (1+) (A) Negative    Nitrite, UA Negative Negative    Protein, POC 30 mg/dL (A) Negative mg/dL    Glucose, UA Negative Negative mg/dL    Ketones, UA Trace (A) Negative    Urobilinogen, UA 0.2 E.U./dL Normal, 0.2 E.U./dL    Bilirubin Small (1+) (A) Negative    Blood, UA Large (A) Negative    Lot Number [id number]     Expiration Date 1/2,024       PSA    PSA 11/22/22   PSA 2.300         Bladder Scan interpretation 02/13/2023    Estimation of residual urine via BVI 3000 Verathon Bladder Scan  MA/nurse performing: Emma FROST LPN  Residual Urine: 38 ml  Indication: Benign prostatic hyperplasia with urinary frequency   Position: Supine  Examination: Incremental scanning of the suprapubic area using 2.0 MHz transducer using copious amounts of acoustic gel.   Findings: An anechoic area was demonstrated which represented the " bladder, with measurement of residual urine as noted. I inspected this myself. In that the residual urine was stable or insignificant, refer to plan for treatment and plan necessary at this time.             Procedures        Assessment and Plan    Diagnoses and all orders for this visit:    1. Benign prostatic hyperplasia with urinary frequency (Primary)  -     POC Urinalysis Dipstick, Automated  -     Urine Culture - Urine, Urine, Clean Catch; Future  -     Bladder Scan  -     Urine Culture - Urine, Urine, Clean Catch      Given the amount of blood in the patient's urine as well as leukocytes on in office urine dip I would like to send his urine for culture today to rule out a urinary tract infection.  If the patient is with a UTI we will treat it and follow-up with him in 6 weeks or sooner if needed    He is without urinary tract infections and we will get him set up for lower urinary tract evaluation via cystoscopy in the office.        I spent 10 minutes caring for Juan Jose on this date of service. This time includes time spent by me in the following activities:reviewing tests, obtaining and/or reviewing a separately obtained history, performing a medically appropriate examination and/or evaluation , counseling and educating the patient/family/caregiver, ordering medications, tests, or procedures, and documenting information in the medical record  Follow Up   Return in about 6 weeks (around 3/27/2023) for Follow-up PVR and UTI.  Patient was given instructions and counseling regarding his condition or for health maintenance advice. Please see specific information pulled into the AVS if appropriate.         This document has been electronically signed by ASHLY De Jesus  February 13, 2023 14:20 EST

## 2023-02-16 DIAGNOSIS — R35.0 BENIGN PROSTATIC HYPERPLASIA WITH URINARY FREQUENCY: ICD-10-CM

## 2023-02-16 DIAGNOSIS — N40.1 BENIGN PROSTATIC HYPERPLASIA WITH URINARY FREQUENCY: ICD-10-CM

## 2023-02-16 LAB — BACTERIA SPEC AEROBE CULT: ABNORMAL

## 2023-02-16 RX ORDER — LEVOFLOXACIN 500 MG/1
500 TABLET, FILM COATED ORAL DAILY
Qty: 5 TABLET | Refills: 0 | Status: SHIPPED | OUTPATIENT
Start: 2023-02-16 | End: 2023-02-21

## 2023-02-16 RX ORDER — LEVOFLOXACIN 500 MG/1
500 TABLET, FILM COATED ORAL DAILY
Qty: 5 TABLET | Refills: 0 | Status: SHIPPED | OUTPATIENT
Start: 2023-02-16 | End: 2023-02-16 | Stop reason: SDUPTHER

## 2023-03-27 ENCOUNTER — OFFICE VISIT (OUTPATIENT)
Dept: UROLOGY | Facility: CLINIC | Age: 78
End: 2023-03-27
Payer: MEDICARE

## 2023-03-27 VITALS — BODY MASS INDEX: 30.49 KG/M2 | RESPIRATION RATE: 16 BRPM | HEIGHT: 65 IN | WEIGHT: 183 LBS

## 2023-03-27 DIAGNOSIS — N40.1 BENIGN PROSTATIC HYPERPLASIA WITH URINARY FREQUENCY: Primary | ICD-10-CM

## 2023-03-27 DIAGNOSIS — R35.0 BENIGN PROSTATIC HYPERPLASIA WITH URINARY FREQUENCY: Primary | ICD-10-CM

## 2023-03-27 LAB
BILIRUB BLD-MCNC: NEGATIVE MG/DL
CLARITY, POC: CLEAR
COLOR UR: YELLOW
EXPIRATION DATE: ABNORMAL
GLUCOSE UR STRIP-MCNC: NEGATIVE MG/DL
KETONES UR QL: NEGATIVE
LEUKOCYTE EST, POC: ABNORMAL
Lab: ABNORMAL
NITRITE UR-MCNC: NEGATIVE MG/ML
PH UR: 6 [PH] (ref 5–8)
PROT UR STRIP-MCNC: NEGATIVE MG/DL
RBC # UR STRIP: ABNORMAL /UL
SP GR UR: 1.02 (ref 1–1.03)
URINE VOLUME: 0
UROBILINOGEN UR QL: ABNORMAL

## 2023-03-27 PROCEDURE — 1160F RVW MEDS BY RX/DR IN RCRD: CPT | Performed by: NURSE PRACTITIONER

## 2023-03-27 PROCEDURE — 81003 URINALYSIS AUTO W/O SCOPE: CPT | Performed by: NURSE PRACTITIONER

## 2023-03-27 PROCEDURE — 1159F MED LIST DOCD IN RCRD: CPT | Performed by: NURSE PRACTITIONER

## 2023-03-27 PROCEDURE — 99212 OFFICE O/P EST SF 10 MIN: CPT | Performed by: NURSE PRACTITIONER

## 2023-03-27 NOTE — PROGRESS NOTES
Chief Complaint: Benign Prostatic Hypertrophy    Subjective         Benign Prostatic Hypertrophy  Irritative symptoms include frequency.   Urinary Frequency   Associated symptoms include frequency.     Lewis C Runner is a 77 y.o. male presents to [facility] to be seen for follow-up urinary retention.    He stopped catheterizing after our last visit.     He is voiding on his own at least every 3-4 hours.     He has not been cathing.     He has no dysuria.    No GH  Since we last saw him.    He reports that he has feeling well at this time.    Previous:    The patient has been catheterizing himself every 8 hours at home.    He has not been able to void on his own.        Objective     Past Medical History:   Diagnosis Date   • Back pain    • BPH (benign prostatic hyperplasia)    • BPH with urinary obstruction 11/18/2015   • Elevated PSA 06/21/2017   • Elevated PSA 11/19/2015   • GERD (gastroesophageal reflux disease)    • History of radiation therapy     Mouth Cancer   • Mouth cancer (HCC)    • Screening for prostate cancer 11/12/2014       Past Surgical History:   Procedure Laterality Date   • BLEPHAROPLASTY Bilateral     Upper and lower eyelids.   • CHOLECYSTECTOMY     • COLONOSCOPY     • RICHIE MANDIBULECTOMY WITH FIBULA SHAFT GRAFT AND NECK DISSECTION     • TONSILECTOMY, ADENOIDECTOMY, BILATERAL MYRINGOTOMY AND TUBES           Current Outpatient Medications:   •  finasteride (PROSCAR) 5 MG tablet, Daily., Disp: , Rfl:   •  levothyroxine (SYNTHROID, LEVOTHROID) 25 MCG tablet, Daily., Disp: , Rfl:   •  multivitamin (THERAGRAN) tablet tablet, Multivitamin 50 Plus tablet  Take by oral route., Disp: , Rfl:   •  omeprazole (priLOSEC) 20 MG capsule, Daily., Disp: , Rfl:   •  polyethylene glycol (MIRALAX) 17 GM/SCOOP powder, Take 17 g by mouth Daily., Disp: , Rfl:   •  tamsulosin (FLOMAX) 0.4 MG capsule 24 hr capsule, Take 2 capsules by mouth Daily., Disp: 180 capsule, Rfl: 4    No Known Allergies  "    Family History   Problem Relation Age of Onset   • Liver cancer Mother    • Heart disease Mother    • Cancer Mother        Social History     Socioeconomic History   • Marital status:    Tobacco Use   • Smoking status: Former     Packs/day: 1.00     Years: 10.00     Pack years: 10.00     Types: Cigarettes   • Smokeless tobacco: Never   • Tobacco comments:     Quit smoking over 50 yrs ago   Vaping Use   • Vaping Use: Never used   Substance and Sexual Activity   • Alcohol use: Never   • Drug use: Never   • Sexual activity: Defer       Vital Signs:   Resp 16   Ht 165.1 cm (65\")   Wt 83 kg (183 lb)   BMI 30.45 kg/m²      Physical Exam     Result Review :   The following data was reviewed by: ASHLY De Jesus on 3/27/2023:  Results for orders placed or performed in visit on 03/27/23   Bladder Scan   Result Value Ref Range    Urine Volume 0    POC Urinalysis Dipstick, Automated    Specimen: Urine   Result Value Ref Range    Color Yellow Yellow, Straw, Dark Yellow, Lizabeth    Clarity, UA Clear Clear    Specific Gravity  1.025 1.005 - 1.030    pH, Urine 6.0 5.0 - 8.0    Leukocytes Moderate (2+) (A) Negative    Nitrite, UA Negative Negative    Protein, POC Negative Negative mg/dL    Glucose, UA Negative Negative mg/dL    Ketones, UA Negative Negative    Urobilinogen, UA 0.2 E.U./dL Normal, 0.2 E.U./dL    Bilirubin Negative Negative    Blood, UA Trace (A) Negative    Lot Number [id number]     Expiration Date 4/2,024       PSA    PSA 11/22/22   PSA 2.300         Bladder Scan interpretation 03/27/2023    Estimation of residual urine via [employer] 3000 Verathon Bladder Scan  MA/nurse performing: Jeff BUSH RN  Residual Urine: 0 ml  Indication: Benign prostatic hyperplasia with urinary frequency   Position: Supine  Examination: Incremental scanning of the suprapubic area using 2.0 MHz transducer using copious amounts of acoustic gel.   Findings: An anechoic area was demonstrated which represented the bladder, with " measurement of residual urine as noted. I inspected this myself. In that the residual urine was stable or insignificant, refer to plan for treatment and plan necessary at this time.             Procedures        Assessment and Plan    Diagnoses and all orders for this visit:    1. Benign prostatic hyperplasia with urinary frequency (Primary)  -     POC Urinalysis Dipstick, Automated  -     Bladder Scan      We will have him continue prostate meds as prescribed.     He will call with any s/s of UTI or retention.         I spent 10 minutes caring for Juan Jose on this date of service. This time includes time spent by me in the following activities:reviewing tests, obtaining and/or reviewing a separately obtained history, performing a medically appropriate examination and/or evaluation , counseling and educating the patient/family/caregiver, ordering medications, tests, or procedures, and documenting information in the medical record  Follow Up   Return in about 3 months (around 6/27/2023) for f/u uti/ retention.  Patient was given instructions and counseling regarding his condition or for health maintenance advice. Please see specific information pulled into the AVS if appropriate.         This document has been electronically signed by ASHLY De Jesus  March 27, 2023 13:35 EDT

## 2023-07-27 DIAGNOSIS — C61 PROSTATE CANCER: Primary | ICD-10-CM

## 2023-12-04 ENCOUNTER — LAB (OUTPATIENT)
Dept: LAB | Facility: HOSPITAL | Age: 78
End: 2023-12-04
Payer: MEDICARE

## 2023-12-04 DIAGNOSIS — C61 PROSTATE CANCER: ICD-10-CM

## 2023-12-04 LAB — PSA SERPL-MCNC: 0.92 NG/ML (ref 0–4)

## 2023-12-04 PROCEDURE — 36415 COLL VENOUS BLD VENIPUNCTURE: CPT

## 2023-12-04 PROCEDURE — 84153 ASSAY OF PSA TOTAL: CPT

## 2023-12-11 ENCOUNTER — OFFICE VISIT (OUTPATIENT)
Dept: UROLOGY | Facility: CLINIC | Age: 78
End: 2023-12-11
Payer: MEDICARE

## 2023-12-11 VITALS
BODY MASS INDEX: 31.92 KG/M2 | HEIGHT: 66 IN | WEIGHT: 198.6 LBS | RESPIRATION RATE: 12 BRPM | SYSTOLIC BLOOD PRESSURE: 154 MMHG | DIASTOLIC BLOOD PRESSURE: 92 MMHG | HEART RATE: 75 BPM

## 2023-12-11 DIAGNOSIS — R35.0 BENIGN PROSTATIC HYPERPLASIA WITH URINARY FREQUENCY: ICD-10-CM

## 2023-12-11 DIAGNOSIS — N40.1 BENIGN PROSTATIC HYPERPLASIA WITH URINARY FREQUENCY: ICD-10-CM

## 2023-12-11 DIAGNOSIS — Z87.440 HX: UTI (URINARY TRACT INFECTION): ICD-10-CM

## 2023-12-11 DIAGNOSIS — C61 PROSTATE CANCER: Primary | ICD-10-CM

## 2023-12-11 LAB
BILIRUB BLD-MCNC: NEGATIVE MG/DL
CLARITY, POC: CLEAR
COLOR UR: YELLOW
EXPIRATION DATE: ABNORMAL
GLUCOSE UR STRIP-MCNC: NEGATIVE MG/DL
KETONES UR QL: ABNORMAL
LEUKOCYTE EST, POC: NEGATIVE
Lab: ABNORMAL
NITRITE UR-MCNC: NEGATIVE MG/ML
PH UR: 6 [PH] (ref 5–8)
PROT UR STRIP-MCNC: NEGATIVE MG/DL
RBC # UR STRIP: NEGATIVE /UL
SP GR UR: 1.02 (ref 1–1.03)
URINE VOLUME: 76
UROBILINOGEN UR QL: NORMAL

## 2023-12-11 PROCEDURE — 99214 OFFICE O/P EST MOD 30 MIN: CPT | Performed by: NURSE PRACTITIONER

## 2023-12-11 PROCEDURE — 1160F RVW MEDS BY RX/DR IN RCRD: CPT | Performed by: NURSE PRACTITIONER

## 2023-12-11 PROCEDURE — 1159F MED LIST DOCD IN RCRD: CPT | Performed by: NURSE PRACTITIONER

## 2023-12-11 PROCEDURE — 81003 URINALYSIS AUTO W/O SCOPE: CPT | Performed by: NURSE PRACTITIONER

## 2023-12-11 PROCEDURE — 51798 US URINE CAPACITY MEASURE: CPT | Performed by: NURSE PRACTITIONER

## 2023-12-11 RX ORDER — MULTIVIT-MIN/FERROUS FUMARATE 15 MG
1 TABLET ORAL DAILY
COMMUNITY

## 2023-12-11 RX ORDER — NAPROXEN SODIUM 220 MG
TABLET ORAL
COMMUNITY

## 2023-12-11 RX ORDER — FINASTERIDE 5 MG/1
5 TABLET, FILM COATED ORAL DAILY
Qty: 90 TABLET | Refills: 4 | Status: SHIPPED | OUTPATIENT
Start: 2023-12-11

## 2023-12-11 RX ORDER — TAMSULOSIN HYDROCHLORIDE 0.4 MG/1
2 CAPSULE ORAL DAILY
Qty: 180 CAPSULE | Refills: 4 | Status: SHIPPED | OUTPATIENT
Start: 2023-12-11

## 2023-12-11 NOTE — PROGRESS NOTES
Chief Complaint: Urinary Retention (Pt here for follow up.  Pt is no longer doing self cathing.  Pt has PSA done.)    Subjective         History of Present Illness  Lewis C Runner is a 78 y.o. male presents to [facility] to be seen for f/u BPH.    Patient has still been voiding on his own at least every 3-4 hours.     He is taking tamsulosin ans finasteride combo.    He has no dysuria.    Nocturia x 2-4 not overly bothered.    He does stop drinking sodas int he afternoon.     He states he stops drinking 2 hours before bed.    No GH  Since we last saw him.    No UTI since we last saw him.    He reports that he has feeling well at this time.    Previous:    The patient has been catheterizing himself every 8 hours at home.    He has not been able to void on his own.    Objective     Past Medical History:   Diagnosis Date    Back pain     BPH (benign prostatic hyperplasia)     BPH with urinary obstruction 11/18/2015    Elevated PSA 06/21/2017    Elevated PSA 11/19/2015    GERD (gastroesophageal reflux disease)     History of radiation therapy     Mouth Cancer    Mouth cancer     Screening for prostate cancer 11/12/2014       Past Surgical History:   Procedure Laterality Date    BLEPHAROPLASTY Bilateral     Upper and lower eyelids.    CHOLECYSTECTOMY      COLONOSCOPY      RICHIE MANDIBULECTOMY WITH FIBULA SHAFT GRAFT AND NECK DISSECTION      TONSILECTOMY, ADENOIDECTOMY, BILATERAL MYRINGOTOMY AND TUBES           Current Outpatient Medications:     finasteride (PROSCAR) 5 MG tablet, Take 1 tablet by mouth Daily., Disp: 90 tablet, Rfl: 4    tamsulosin (FLOMAX) 0.4 MG capsule 24 hr capsule, Take 2 capsules by mouth Daily., Disp: 180 capsule, Rfl: 4    levothyroxine (SYNTHROID, LEVOTHROID) 25 MCG tablet, Daily., Disp: , Rfl:     Multiple Vitamins-Minerals (Multivitamin-Minerals) tablet, Take 1 tablet by mouth Daily., Disp: , Rfl:     naproxen sodium (Aleve) 220 MG tablet, Take 1 tablet every 12 hours  "by oral route as needed., Disp: , Rfl:     omeprazole (priLOSEC) 20 MG capsule, Daily., Disp: , Rfl:     polyethylene glycol (MIRALAX) 17 GM/SCOOP powder, Take 17 g by mouth Daily., Disp: , Rfl:     Sennosides 25 MG tablet, Take 1 tablet every day by oral route., Disp: , Rfl:     No Known Allergies     Family History   Problem Relation Age of Onset    Liver cancer Mother     Heart disease Mother     Cancer Mother        Social History     Socioeconomic History    Marital status:    Tobacco Use    Smoking status: Former     Packs/day: 1.00     Years: 10.00     Additional pack years: 0.00     Total pack years: 10.00     Types: Cigarettes     Passive exposure: Past    Smokeless tobacco: Never    Tobacco comments:     Quit smoking over 50 yrs ago   Vaping Use    Vaping Use: Never used   Substance and Sexual Activity    Alcohol use: Never    Drug use: Never    Sexual activity: Defer       Vital Signs:   /92 (BP Location: Left arm, Patient Position: Sitting)   Pulse 75   Resp 12   Ht 167.6 cm (66\")   Wt 90.1 kg (198 lb 9.6 oz)   BMI 32.05 kg/m²      Physical Exam     Result Review :   The following data was reviewed by: ASHLY De Jesus on 12/11/2023:  Results for orders placed or performed in visit on 12/11/23   Bladder Scan   Result Value Ref Range    Urine Volume 76    POC Urinalysis Dipstick, Automated    Specimen: Urine   Result Value Ref Range    Color Yellow Yellow, Straw, Dark Yellow, Lizabeth    Clarity, UA Clear Clear    Specific Gravity  1.025 1.005 - 1.030    pH, Urine 6.0 5.0 - 8.0    Leukocytes Negative Negative    Nitrite, UA Negative Negative    Protein, POC Negative Negative mg/dL    Glucose, UA Negative Negative mg/dL    Ketones, UA Trace (A) Negative    Urobilinogen, UA Normal Normal, 0.2 E.U./dL    Bilirubin Negative Negative    Blood, UA Negative Negative    Lot Number [id number]     Expiration Date 2,024/8       PSA          12/4/2023    13:18   PSA   PSA 0.924      Bladder Scan " interpretation 12/11/2023    Estimation of residual urine via BVI 3000 Verathon Bladder Scan  MA/nurse performing: aracelis dalton ma  Residual Urine: 76 ml  Indication: Prostate cancer    Hx: UTI (urinary tract infection)    Benign prostatic hyperplasia with urinary frequency   Position: Supine  Examination: Incremental scanning of the suprapubic area using 2.0 MHz transducer using copious amounts of acoustic gel.   Findings: An anechoic area was demonstrated which represented the bladder, with measurement of residual urine as noted. I inspected this myself. In that the residual urine was stable or insignificant, refer to plan for treatment and plan necessary at this time.          Procedures        Assessment and Plan    Diagnoses and all orders for this visit:    1. Prostate cancer (Primary)  -     POC Urinalysis Dipstick, Automated  -     Bladder Scan  -     finasteride (PROSCAR) 5 MG tablet; Take 1 tablet by mouth Daily.  Dispense: 90 tablet; Refill: 4    2. Hx: UTI (urinary tract infection)  -     POC Urinalysis Dipstick, Automated  -     Bladder Scan    3. Benign prostatic hyperplasia with urinary frequency  -     POC Urinalysis Dipstick, Automated  -     Bladder Scan  -     tamsulosin (FLOMAX) 0.4 MG capsule 24 hr capsule; Take 2 capsules by mouth Daily.  Dispense: 180 capsule; Refill: 4      Patient will continue tamsulosin and finasteride combination and follow-up with him in 6 months or sooner if needed.              I spent 10 minutes caring for Juan Jose on this date of service. This time includes time spent by me in the following activities:reviewing tests, obtaining and/or reviewing a separately obtained history, performing a medically appropriate examination and/or evaluation , counseling and educating the patient/family/caregiver, ordering medications, tests, or procedures, and documenting information in the medical record  Follow Up   Return in about 6 months (around 6/11/2024) for f/u BPH with PVR .  Patient  was given instructions and counseling regarding his condition or for health maintenance advice. Please see specific information pulled into the AVS if appropriate.         This document has been electronically signed by ASHLY De Jesus  December 11, 2023 10:52 EST

## 2024-05-28 ENCOUNTER — TELEPHONE (OUTPATIENT)
Dept: UROLOGY | Facility: CLINIC | Age: 79
End: 2024-05-28
Payer: MEDICARE

## 2024-05-28 NOTE — TELEPHONE ENCOUNTER
Provider: CHEL IVORY    Caller: MARGARET RUNNER    Relationship to Patient: SPOUSE    Reason for Call: PATIENT WAS SEEN ON MAY 24TH AT [facility] FOR SOME BLADDER PRESSURE. ALL TESTING NEGATIVE. BUT THE ER WANTED HIM TO FOLLOW UP WITH CHEL. HE CURRENTLY HAS AN APPOINTMENT JUNE 12. WILL THIS APPOINTMENT WORK AND DOES HE NEED A PSA PRIOR TO THIS APPOINTMENT. PLEASE REACH OUT TO FREDY    BEST NUMBER [phone] YOU MAY LEAVE A MESSAGE IF SHE DOES NOT ANSWER.

## 2024-05-28 NOTE — TELEPHONE ENCOUNTER
Spoke to pts wife.  They can keep June 12 appt.  Continue with medications as they are.  If pt gets worse please return to the ED in [facility].  Pt will not need to repeat the PSA at this time.  His wife is of understanding.

## 2024-06-12 ENCOUNTER — OFFICE VISIT (OUTPATIENT)
Dept: UROLOGY | Facility: CLINIC | Age: 79
End: 2024-06-12
Payer: MEDICARE

## 2024-06-12 VITALS
DIASTOLIC BLOOD PRESSURE: 97 MMHG | WEIGHT: 194 LBS | HEIGHT: 66 IN | RESPIRATION RATE: 16 BRPM | BODY MASS INDEX: 31.18 KG/M2 | SYSTOLIC BLOOD PRESSURE: 166 MMHG | HEART RATE: 72 BPM

## 2024-06-12 DIAGNOSIS — N40.1 BENIGN PROSTATIC HYPERPLASIA WITH URINARY FREQUENCY: ICD-10-CM

## 2024-06-12 DIAGNOSIS — Z87.440 HX: UTI (URINARY TRACT INFECTION): Primary | ICD-10-CM

## 2024-06-12 DIAGNOSIS — R35.0 BENIGN PROSTATIC HYPERPLASIA WITH URINARY FREQUENCY: ICD-10-CM

## 2024-06-12 PROBLEM — A41.9 SEPSIS: Status: RESOLVED | Noted: 2022-12-19 | Resolved: 2024-06-12

## 2024-06-12 PROBLEM — K81.0 ACUTE CHOLECYSTITIS: Status: RESOLVED | Noted: 2023-01-09 | Resolved: 2024-06-12

## 2024-06-12 PROBLEM — Z92.3 HX OF RADIATION THERAPY: Status: RESOLVED | Noted: 2021-07-24 | Resolved: 2024-06-12

## 2024-06-12 PROBLEM — N40.0 BPH (BENIGN PROSTATIC HYPERPLASIA): Status: RESOLVED | Noted: 2021-07-24 | Resolved: 2024-06-12

## 2024-06-12 LAB
BILIRUB BLD-MCNC: NEGATIVE MG/DL
CLARITY, POC: CLEAR
COLOR UR: YELLOW
EXPIRATION DATE: NORMAL
GLUCOSE UR STRIP-MCNC: NEGATIVE MG/DL
KETONES UR QL: NEGATIVE
LEUKOCYTE EST, POC: NEGATIVE
Lab: NORMAL
NITRITE UR-MCNC: NEGATIVE MG/ML
PH UR: 5.5 [PH] (ref 5–8)
PROT UR STRIP-MCNC: NEGATIVE MG/DL
RBC # UR STRIP: NEGATIVE /UL
SP GR UR: 1 (ref 1–1.03)
URINE VOLUME: 77
UROBILINOGEN UR QL: NORMAL

## 2024-06-12 NOTE — PROGRESS NOTES
"Chief Complaint: No chief complaint on file.    Subjective         History of Present Illness  Lewis C Runner is a 78 y.o. male presents to [facility] to be seen for f/u BPH.    Patient has still been voiding on his own at least every 3-4 hours.     He is taking tamsulosin and finasteride combo.    He has no dysuria.    No uti/ gh     Nocturia x 2-3 not overly bothered.    He reports that he has feeling well at this time.    Was seen in the ED 5/24/24 for bladder pressure \"Patient states that he has had some suprapubic pain over the last few days. Patient has a history of BPH and was sent by his primary care provider for evaluation of bladder distention. He states he had an unremarkable urinalysis done there. Patient states he has been urinating approximately 7-9 times a day. He states he feels that he may not be emptying. Patient states that he is not currently self cathing however he has in the past after complication from a gallbladder surgery. Patient denies any dysuria, hematuria. Last bowel movement was today and he reports 2 that were normal for him. Denies any constipation or diarrhea. Denies any fevers, chills, systemic symptoms.\"    His PVR was 0 per their account.    Previous:    The patient has been catheterizing himself every 8 hours at home.    He has not been able to void on his own.    Objective     Past Medical History:   Diagnosis Date    Back pain     BPH (benign prostatic hyperplasia)     BPH with urinary obstruction 11/18/2015    Elevated PSA 06/21/2017    Elevated PSA 11/19/2015    GERD (gastroesophageal reflux disease)     History of radiation therapy     Mouth Cancer    Mouth cancer     Screening for prostate cancer 11/12/2014       Past Surgical History:   Procedure Laterality Date    BLEPHAROPLASTY Bilateral     Upper and lower eyelids.    CHOLECYSTECTOMY      COLONOSCOPY      RICHIE MANDIBULECTOMY WITH FIBULA SHAFT GRAFT AND NECK DISSECTION      TONSILECTOMY, " "ADENOIDECTOMY, BILATERAL MYRINGOTOMY AND TUBES           Current Outpatient Medications:     finasteride (PROSCAR) 5 MG tablet, Take 1 tablet by mouth Daily., Disp: 90 tablet, Rfl: 4    levothyroxine (SYNTHROID, LEVOTHROID) 25 MCG tablet, Daily., Disp: , Rfl:     naproxen sodium (Aleve) 220 MG tablet, Take 1 tablet every 12 hours by oral route as needed., Disp: , Rfl:     omeprazole (priLOSEC) 20 MG capsule, Daily., Disp: , Rfl:     polyethylene glycol (MIRALAX) 17 GM/SCOOP powder, Take 17 g by mouth Daily., Disp: , Rfl:     tamsulosin (FLOMAX) 0.4 MG capsule 24 hr capsule, Take 2 capsules by mouth Daily., Disp: 180 capsule, Rfl: 4    No Known Allergies     Family History   Problem Relation Age of Onset    Liver cancer Mother     Heart disease Mother     Cancer Mother        Social History     Socioeconomic History    Marital status:    Tobacco Use    Smoking status: Former     Current packs/day: 1.00     Average packs/day: 1 pack/day for 10.0 years (10.0 ttl pk-yrs)     Types: Cigarettes     Passive exposure: Past    Smokeless tobacco: Never    Tobacco comments:     Quit smoking over 50 yrs ago   Vaping Use    Vaping status: Never Used   Substance and Sexual Activity    Alcohol use: Never    Drug use: Never    Sexual activity: Defer       Vital Signs:   /97   Pulse 72   Resp 16   Ht 167.6 cm (66\")   Wt 88 kg (194 lb)   BMI 31.31 kg/m²      Physical Exam     Result Review :   The following data was reviewed by: ASHLY De Jesus on 6/12/2024:  Results for orders placed or performed in visit on 06/12/24   Bladder Scan   Result Value Ref Range    Urine Volume 77    POC Urinalysis Dipstick, Automated    Specimen: Urine   Result Value Ref Range    Color Yellow Yellow, Straw, Dark Yellow, Lizabeth    Clarity, UA Clear Clear    Specific Gravity  1.005 1.005 - 1.030    pH, Urine 5.5 5.0 - 8.0    Leukocytes Negative Negative    Nitrite, UA Negative Negative    Protein, POC Negative Negative mg/dL    " Glucose, UA Negative Negative mg/dL    Ketones, UA Negative Negative    Urobilinogen, UA Normal Normal, 0.2 E.U./dL    Bilirubin Negative Negative    Blood, UA Negative Negative    Lot Number [id number]     Expiration Date 2,024/9       PSA          12/4/2023    13:18   PSA   PSA 0.924      Bladder Scan interpretation 6/12/2024    Estimation of residual urine via BVI 3000 Verathon Bladder Scan  MA/nurse performing: tariq larry Rn   Residual Urine: 77 ml  Indication: Hx: UTI (urinary tract infection)    Benign prostatic hyperplasia with urinary frequency   Position: Supine  Examination: Incremental scanning of the suprapubic area using 2.0 MHz transducer using copious amounts of acoustic gel.   Findings: An anechoic area was demonstrated which represented the bladder, with measurement of residual urine as noted. I inspected this myself. In that the residual urine was stable or insignificant, refer to plan for treatment and plan necessary at this time.          Procedures        Assessment and Plan    Diagnoses and all orders for this visit:    1. Hx: UTI (urinary tract infection) (Primary)  -     POC Urinalysis Dipstick, Automated  -     Bladder Scan    2. Benign prostatic hyperplasia with urinary frequency      PVR stable at this time.       Patient will continue tamsulosin and finasteride combination and follow-up with him in  months or sooner if needed.              I spent 10 minutes caring for Juan Jose on this date of service. This time includes time spent by me in the following activities:reviewing tests, obtaining and/or reviewing a separately obtained history, performing a medically appropriate examination and/or evaluation , counseling and educating the patient/family/caregiver, ordering medications, tests, or procedures, and documenting information in the medical record  Follow Up   Return in about 6 months (around 12/12/2024) for f/u BPH with PVR .  Patient was given instructions and counseling regarding his  condition or for health maintenance advice. Please see specific information pulled into the AVS if appropriate.         This document has been electronically signed by ASHLY De Jesus  June 12, 2024 11:51 EDT

## 2024-12-12 DIAGNOSIS — N40.1 BENIGN PROSTATIC HYPERPLASIA WITH URINARY FREQUENCY: ICD-10-CM

## 2024-12-12 DIAGNOSIS — R35.0 BENIGN PROSTATIC HYPERPLASIA WITH URINARY FREQUENCY: ICD-10-CM

## 2024-12-12 DIAGNOSIS — C61 PROSTATE CANCER: ICD-10-CM

## 2024-12-12 RX ORDER — FINASTERIDE 5 MG/1
5 TABLET, FILM COATED ORAL DAILY
Qty: 90 TABLET | Refills: 4 | Status: SHIPPED | OUTPATIENT
Start: 2024-12-12

## 2024-12-12 RX ORDER — TAMSULOSIN HYDROCHLORIDE 0.4 MG/1
2 CAPSULE ORAL DAILY
Qty: 180 CAPSULE | Refills: 4 | Status: SHIPPED | OUTPATIENT
Start: 2024-12-12

## 2024-12-12 NOTE — TELEPHONE ENCOUNTER
Caller: FREDY    Relationship: [employer]    Best call back number: [phone]    Requested Prescriptions:   Requested Prescriptions     Pending Prescriptions Disp Refills    finasteride (PROSCAR) 5 MG tablet 90 tablet 4     Sig: Take 1 tablet by mouth Daily.    tamsulosin (FLOMAX) 0.4 MG capsule 24 hr capsule 180 capsule 4     Sig: Take 2 capsules by mouth Daily.        Pharmacy where request should be sent:      Last office visit with prescribing clinician: 2024   Last telemedicine visit with prescribing clinician: Visit date not found   Next office visit with prescribing clinician: 3/17/2025       Does the patient have less than a 3 day supply:  [] Yes  [x] No    Would you like a call back once the refill request has been completed: [x] Yes [] No    If the office needs to give you a call back, can they leave a voicemail: [x] Yes [] No    Cristi Roche-Page   24 12:34 EST     HIS REFILL  YESTERDAY.

## 2025-06-04 ENCOUNTER — OFFICE VISIT (OUTPATIENT)
Dept: UROLOGY | Facility: CLINIC | Age: 80
End: 2025-06-04
Payer: MEDICARE

## 2025-06-04 VITALS — RESPIRATION RATE: 20 BRPM | BODY MASS INDEX: 29.34 KG/M2 | WEIGHT: 181.8 LBS

## 2025-06-04 DIAGNOSIS — Z87.440 HX: UTI (URINARY TRACT INFECTION): ICD-10-CM

## 2025-06-04 DIAGNOSIS — N40.1 BENIGN PROSTATIC HYPERPLASIA WITH URINARY FREQUENCY: Primary | ICD-10-CM

## 2025-06-04 DIAGNOSIS — R35.0 BENIGN PROSTATIC HYPERPLASIA WITH URINARY FREQUENCY: Primary | ICD-10-CM

## 2025-06-04 LAB
BILIRUB BLD-MCNC: ABNORMAL MG/DL
CLARITY, POC: CLEAR
COLOR UR: ABNORMAL
EXPIRATION DATE: ABNORMAL
GLUCOSE UR STRIP-MCNC: NEGATIVE MG/DL
KETONES UR QL: ABNORMAL
LEUKOCYTE EST, POC: NEGATIVE
Lab: ABNORMAL
NITRITE UR-MCNC: NEGATIVE MG/ML
PH UR: 5 [PH] (ref 5–8)
PROT UR STRIP-MCNC: ABNORMAL MG/DL
RBC # UR STRIP: NEGATIVE /UL
SP GR UR: 1.03 (ref 1–1.03)
SPECIMEN VOL 24H UR: NORMAL L
UROBILINOGEN UR QL: NORMAL

## 2025-06-04 RX ORDER — B-COMPLEX WITH VITAMIN C
1 TABLET ORAL DAILY
COMMUNITY

## 2025-06-04 RX ORDER — HYDROCODONE BITARTRATE AND ACETAMINOPHEN 5; 325 MG/1; MG/1
TABLET ORAL
COMMUNITY
Start: 2025-04-12

## 2025-06-04 RX ORDER — NEBIVOLOL 10 MG/1
1 TABLET ORAL DAILY
COMMUNITY
Start: 2025-04-11

## 2025-06-04 RX ORDER — LISINOPRIL 5 MG/1
TABLET ORAL
COMMUNITY
Start: 2025-05-11

## 2025-06-04 NOTE — PROGRESS NOTES
"Chief Complaint: hx: UTI and Benign Prostatic Hypertrophy    Subjective         Benign Prostatic Hypertrophy      Lewis C Runner is a 79 y.o. male presents to [facility] to be seen for f/u BPH.    The patient has continued on tamsulosin and finasteride.     Stream is good.     Denies straining or hesitancy.     Nocturia x 2     No gh or dysruia.     No utis in the last year.     Pvr 34    No longer doing CIC.     Previous:     Patient has still been voiding on his own at least every 3-4 hours.     He is taking tamsulosin and finasteride combo.    He has no dysuria.    No uti/ gh     Nocturia x 2-3 not overly bothered.    He reports that he has feeling well at this time.    Was seen in the ED 5/24/24 for bladder pressure \"Patient states that he has had some suprapubic pain over the last few days. Patient has a history of BPH and was sent by his primary care provider for evaluation of bladder distention. He states he had an unremarkable urinalysis done there. Patient states he has been urinating approximately 7-9 times a day. He states he feels that he may not be emptying. Patient states that he is not currently self cathing however he has in the past after complication from a gallbladder surgery. Patient denies any dysuria, hematuria. Last bowel movement was today and he reports 2 that were normal for him. Denies any constipation or diarrhea. Denies any fevers, chills, systemic symptoms.\"    His PVR was 0 per their account.    Previous:    The patient has been catheterizing himself every 8 hours at home.    He has not been able to void on his own.    Objective     Past Medical History:   Diagnosis Date    Back pain     BPH (benign prostatic hyperplasia)     BPH with urinary obstruction 11/18/2015    Elevated PSA 06/21/2017    Elevated PSA 11/19/2015    GERD (gastroesophageal reflux disease)     History of radiation therapy     Mouth Cancer    Mouth cancer     Screening for prostate cancer " 11/12/2014       Past Surgical History:   Procedure Laterality Date    BLEPHAROPLASTY Bilateral     Upper and lower eyelids.    CHOLECYSTECTOMY      COLONOSCOPY      RICHIE MANDIBULECTOMY WITH FIBULA SHAFT GRAFT AND NECK DISSECTION      TONSILECTOMY, ADENOIDECTOMY, BILATERAL MYRINGOTOMY AND TUBES           Current Outpatient Medications:     HYDROcodone-acetaminophen (NORCO) 5-325 MG per tablet, , Disp: , Rfl:     lisinopril (PRINIVIL,ZESTRIL) 5 MG tablet, , Disp: , Rfl:     nebivolol (BYSTOLIC) 10 MG tablet, Take 1 tablet by mouth Daily., Disp: , Rfl:     finasteride (PROSCAR) 5 MG tablet, Take 1 tablet by mouth Daily., Disp: 90 tablet, Rfl: 4    levothyroxine (SYNTHROID, LEVOTHROID) 25 MCG tablet, Daily., Disp: , Rfl:     multivitamin with minerals (One Daily Calcium/Iron) tablet tablet, Take 1 tablet by mouth Daily., Disp: , Rfl:     omeprazole (priLOSEC) 20 MG capsule, Daily., Disp: , Rfl:     polyethylene glycol (MIRALAX) 17 GM/SCOOP powder, Take 17 g by mouth Daily., Disp: , Rfl:     tamsulosin (FLOMAX) 0.4 MG capsule 24 hr capsule, Take 2 capsules by mouth Daily., Disp: 180 capsule, Rfl: 4    No Known Allergies     Family History   Problem Relation Age of Onset    Liver cancer Mother     Heart disease Mother     Cancer Mother        Social History     Socioeconomic History    Marital status:    Tobacco Use    Smoking status: Former     Current packs/day: 1.00     Average packs/day: 1 pack/day for 10.0 years (10.0 ttl pk-yrs)     Types: Cigarettes     Passive exposure: Past    Smokeless tobacco: Never    Tobacco comments:     Quit smoking over 50 yrs ago   Vaping Use    Vaping status: Never Used   Substance and Sexual Activity    Alcohol use: Never    Drug use: Never    Sexual activity: Defer       Vital Signs:   Resp 20   Wt 82.5 kg (181 lb 12.8 oz)   BMI 29.34 kg/m²      Physical Exam     Result Review :   The following data was reviewed by: ASHLY De Jesus on 6/04/2025:  Results for orders  placed or performed in visit on 06/04/25   Bladder Scan    Collection Time: 06/04/25 10:01 AM   Result Value Ref Range    Volume 35ML    POC Urinalysis Dipstick, Automated    Collection Time: 06/04/25 10:02 AM    Specimen: Urine   Result Value Ref Range    Color Orange (A) Yellow, Straw, Dark Yellow, Lizabeth    Clarity, UA Clear Clear    Specific Gravity  1.030 1.005 - 1.030    pH, Urine 5.0 5.0 - 8.0    Leukocytes Negative Negative    Nitrite, UA Negative Negative    Protein, POC Trace (A) Negative mg/dL    Glucose, UA Negative Negative mg/dL    Ketones, UA Trace (A) Negative    Urobilinogen, UA Normal Normal, 0.2 E.U./dL    Bilirubin Small (1+) (A) Negative    Blood, UA Negative Negative    Lot Number [id number]     Expiration Date 4/2,026           Bladder Scan interpretation 6/14/2025    Estimation of residual urine via BVI 3000 Verathon Bladder Scan  MA/nurse performing: chio vasques rn  Residual Urine: 34 ml  Indication: Benign prostatic hyperplasia with urinary frequency    Hx: UTI (urinary tract infection)   Position: Supine  Examination: Incremental scanning of the suprapubic area using 2.0 MHz transducer using copious amounts of acoustic gel.   Findings: An anechoic area was demonstrated which represented the bladder, with measurement of residual urine as noted. I inspected this myself. In that the residual urine was stable or insignificant, refer to plan for treatment and plan necessary at this time.          Procedures        Assessment and Plan    Diagnoses and all orders for this visit:    1. Benign prostatic hyperplasia with urinary frequency (Primary)  -     POC Urinalysis Dipstick, Automated  -     Bladder Scan    2. Hx: UTI (urinary tract infection)  -     POC Urinalysis Dipstick, Automated  -     Bladder Scan      PVR stable at this time.     No utis since we last saw him.     Continue tamsulosin and finasteride and f/u in 1 year or sooner if needed.    I spent 10 minutes caring for Juan Jose on this date of  service. This time includes time spent by me in the following activities:reviewing tests, obtaining and/or reviewing a separately obtained history, performing a medically appropriate examination and/or evaluation , counseling and educating the patient/family/caregiver, ordering medications, tests, or procedures, and documenting information in the medical record  Follow Up   Return in about 1 year (around 6/4/2026) for annual f/u bph with PVR .  Patient was given instructions and counseling regarding his condition or for health maintenance advice. Please see specific information pulled into the AVS if appropriate.         This document has been electronically signed by ASHLY De Jesus  June 4, 2025 10:29 EDT

## 2025-08-12 ENCOUNTER — TRANSCRIBE ORDERS (OUTPATIENT)
Dept: ADMINISTRATIVE | Facility: HOSPITAL | Age: 80
End: 2025-08-12
Payer: MEDICARE

## 2025-08-12 DIAGNOSIS — C06.2 MALIGNANT NEOPLASM OF RETROMOLAR AREA: Primary | ICD-10-CM

## 2025-08-19 ENCOUNTER — HOSPITAL ENCOUNTER (OUTPATIENT)
Dept: CT IMAGING | Facility: HOSPITAL | Age: 80
Discharge: HOME | End: 2025-08-19
Admitting: RADIOLOGY
Payer: MEDICARE

## 2025-08-19 DIAGNOSIS — C06.2 MALIGNANT NEOPLASM OF RETROMOLAR AREA: ICD-10-CM

## 2025-08-19 LAB
CREAT BLDA-MCNC: 0.9 MG/DL (ref 0.6–1.3)
EGFRCR SERPLBLD CKD-EPI 2021: 87.4 ML/MIN/1.73

## 2025-08-19 PROCEDURE — 70491 CT SOFT TISSUE NECK W/DYE: CPT

## 2025-08-19 PROCEDURE — 25510000001 IOPAMIDOL PER 1 ML: Performed by: RADIOLOGY

## 2025-08-19 PROCEDURE — 71260 CT THORAX DX C+: CPT

## 2025-08-19 PROCEDURE — 82565 ASSAY OF CREATININE: CPT

## 2025-08-19 RX ORDER — IOPAMIDOL 755 MG/ML
100 INJECTION, SOLUTION INTRAVASCULAR
Status: COMPLETED | OUTPATIENT
Start: 2025-08-19 | End: 2025-08-19

## 2025-08-19 RX ADMIN — IOPAMIDOL 100 ML: 755 INJECTION, SOLUTION INTRAVENOUS at 12:51
